# Patient Record
Sex: FEMALE | Race: WHITE | NOT HISPANIC OR LATINO
[De-identification: names, ages, dates, MRNs, and addresses within clinical notes are randomized per-mention and may not be internally consistent; named-entity substitution may affect disease eponyms.]

---

## 2018-08-22 ENCOUNTER — RESULT REVIEW (OUTPATIENT)
Age: 63
End: 2018-08-22

## 2020-08-26 ENCOUNTER — RESULT REVIEW (OUTPATIENT)
Age: 65
End: 2020-08-26

## 2021-04-13 ENCOUNTER — NON-APPOINTMENT (OUTPATIENT)
Age: 66
End: 2021-04-13

## 2021-04-13 DIAGNOSIS — Z85.41 PERSONAL HISTORY OF MALIGNANT NEOPLASM OF CERVIX UTERI: ICD-10-CM

## 2021-04-13 DIAGNOSIS — Z81.8 FAMILY HISTORY OF OTHER MENTAL AND BEHAVIORAL DISORDERS: ICD-10-CM

## 2021-04-13 DIAGNOSIS — R73.01 IMPAIRED FASTING GLUCOSE: ICD-10-CM

## 2021-04-13 DIAGNOSIS — Z87.42 PERSONAL HISTORY OF OTHER DISEASES OF THE FEMALE GENITAL TRACT: ICD-10-CM

## 2021-04-13 DIAGNOSIS — Z83.438 FAMILY HISTORY OF OTHER DISORDER OF LIPOPROTEIN METABOLISM AND OTHER LIPIDEMIA: ICD-10-CM

## 2021-04-13 DIAGNOSIS — Z82.62 FAMILY HISTORY OF OSTEOPOROSIS: ICD-10-CM

## 2021-04-13 DIAGNOSIS — Z87.891 PERSONAL HISTORY OF NICOTINE DEPENDENCE: ICD-10-CM

## 2021-04-13 DIAGNOSIS — Z86.2 PERSONAL HISTORY OF DISEASES OF THE BLOOD AND BLOOD-FORMING ORGANS AND CERTAIN DISORDERS INVOLVING THE IMMUNE MECHANISM: ICD-10-CM

## 2021-04-13 DIAGNOSIS — Z86.79 PERSONAL HISTORY OF OTHER DISEASES OF THE CIRCULATORY SYSTEM: ICD-10-CM

## 2021-04-13 DIAGNOSIS — Z78.9 OTHER SPECIFIED HEALTH STATUS: ICD-10-CM

## 2021-04-13 DIAGNOSIS — Z87.442 PERSONAL HISTORY OF URINARY CALCULI: ICD-10-CM

## 2021-04-13 DIAGNOSIS — Z92.89 PERSONAL HISTORY OF OTHER MEDICAL TREATMENT: ICD-10-CM

## 2021-04-13 DIAGNOSIS — Z82.49 FAMILY HISTORY OF ISCHEMIC HEART DISEASE AND OTHER DISEASES OF THE CIRCULATORY SYSTEM: ICD-10-CM

## 2021-04-13 RX ORDER — UBIDECARENONE 100 MG
100 CAPSULE ORAL
Refills: 0 | Status: ACTIVE | COMMUNITY

## 2021-04-13 RX ORDER — ACETAMINOPHEN 325 MG
TABLET ORAL
Refills: 0 | Status: ACTIVE | COMMUNITY

## 2021-04-13 RX ORDER — CALCIUM CARBONATE/VITAMIN D3 600 MG-10
TABLET ORAL
Refills: 0 | Status: ACTIVE | COMMUNITY

## 2021-04-21 ENCOUNTER — APPOINTMENT (OUTPATIENT)
Dept: FAMILY MEDICINE | Facility: CLINIC | Age: 66
End: 2021-04-21
Payer: COMMERCIAL

## 2021-04-21 VITALS
WEIGHT: 123 LBS | DIASTOLIC BLOOD PRESSURE: 82 MMHG | OXYGEN SATURATION: 98 % | HEIGHT: 63 IN | HEART RATE: 58 BPM | BODY MASS INDEX: 21.79 KG/M2 | TEMPERATURE: 97.8 F | SYSTOLIC BLOOD PRESSURE: 144 MMHG | RESPIRATION RATE: 15 BRPM

## 2021-04-21 VITALS
OXYGEN SATURATION: 98 % | DIASTOLIC BLOOD PRESSURE: 82 MMHG | TEMPERATURE: 97.8 F | WEIGHT: 123 LBS | BODY MASS INDEX: 21.79 KG/M2 | HEIGHT: 63 IN | HEART RATE: 58 BPM | SYSTOLIC BLOOD PRESSURE: 144 MMHG

## 2021-04-21 PROCEDURE — 99072 ADDL SUPL MATRL&STAF TM PHE: CPT

## 2021-04-21 PROCEDURE — 36415 COLL VENOUS BLD VENIPUNCTURE: CPT

## 2021-04-21 PROCEDURE — 99213 OFFICE O/P EST LOW 20 MIN: CPT | Mod: 25

## 2021-04-21 NOTE — HISTORY OF PRESENT ILLNESS
[FreeTextEntry1] : f/u hypercholestremia. [de-identified] : Patient presents to follow up for her cholesterol.  She has been taking the Atorvastatin regularly, and she does try to watch her diet. She does feel well.

## 2021-04-22 LAB
ALBUMIN SERPL ELPH-MCNC: 5.2 G/DL
ALP BLD-CCNC: 76 U/L
ALT SERPL-CCNC: 10 U/L
ANION GAP SERPL CALC-SCNC: 13 MMOL/L
AST SERPL-CCNC: 18 U/L
BILIRUB SERPL-MCNC: 0.5 MG/DL
BUN SERPL-MCNC: 10 MG/DL
CALCIUM SERPL-MCNC: 10.1 MG/DL
CHLORIDE SERPL-SCNC: 98 MMOL/L
CHOLEST SERPL-MCNC: 217 MG/DL
CO2 SERPL-SCNC: 25 MMOL/L
CREAT SERPL-MCNC: 0.67 MG/DL
GLUCOSE SERPL-MCNC: 97 MG/DL
HDLC SERPL-MCNC: 76 MG/DL
LDLC SERPL CALC-MCNC: 125 MG/DL
NONHDLC SERPL-MCNC: 140 MG/DL
POTASSIUM SERPL-SCNC: 4.3 MMOL/L
PROT SERPL-MCNC: 7.9 G/DL
SODIUM SERPL-SCNC: 136 MMOL/L
TRIGL SERPL-MCNC: 79 MG/DL

## 2021-04-25 ENCOUNTER — RX RENEWAL (OUTPATIENT)
Age: 66
End: 2021-04-25

## 2021-05-14 ENCOUNTER — RX RENEWAL (OUTPATIENT)
Age: 66
End: 2021-05-14

## 2021-07-25 ENCOUNTER — RX RENEWAL (OUTPATIENT)
Age: 66
End: 2021-07-25

## 2021-10-04 ENCOUNTER — NON-APPOINTMENT (OUTPATIENT)
Age: 66
End: 2021-10-04

## 2021-10-05 ENCOUNTER — APPOINTMENT (OUTPATIENT)
Dept: FAMILY MEDICINE | Facility: CLINIC | Age: 66
End: 2021-10-05
Payer: COMMERCIAL

## 2021-10-05 VITALS
HEART RATE: 60 BPM | WEIGHT: 116 LBS | TEMPERATURE: 97.2 F | OXYGEN SATURATION: 98 % | BODY MASS INDEX: 20.55 KG/M2 | DIASTOLIC BLOOD PRESSURE: 90 MMHG | HEIGHT: 63 IN | SYSTOLIC BLOOD PRESSURE: 140 MMHG

## 2021-10-05 PROCEDURE — G0008: CPT

## 2021-10-05 PROCEDURE — 99072 ADDL SUPL MATRL&STAF TM PHE: CPT

## 2021-10-05 PROCEDURE — 99213 OFFICE O/P EST LOW 20 MIN: CPT | Mod: 25

## 2021-10-05 PROCEDURE — 90686 IIV4 VACC NO PRSV 0.5 ML IM: CPT

## 2021-10-05 RX ORDER — GABAPENTIN 100 MG/1
100 CAPSULE ORAL
Refills: 0 | Status: DISCONTINUED | COMMUNITY
End: 2021-10-05

## 2021-10-05 RX ORDER — OXYCODONE 5 MG/1
5 TABLET ORAL
Refills: 0 | Status: DISCONTINUED | COMMUNITY
End: 2021-10-05

## 2021-10-05 RX ORDER — OLMESARTAN MEDOXOMIL 20 MG/1
20 TABLET, FILM COATED ORAL
Refills: 0 | Status: DISCONTINUED | COMMUNITY
End: 2021-10-05

## 2021-10-05 NOTE — PLAN
[FreeTextEntry1] : BPs reviewed today and are elevated , I have increased Olmesartan to 20 mg daily.  I have recommended pt see Cardiology for further evaluation of elevated and fluctuating readings/\par Flu shot administered and Xanax refilled.\par  Advised to continue medication , diet , exercise and BP monitoring and followups. as well as self relaxation techniques . \par

## 2021-10-05 NOTE — PHYSICAL EXAM
[No Acute Distress] : no acute distress [Well Nourished] : well nourished [Well Developed] : well developed [Well-Appearing] : well-appearing [No JVD] : no jugular venous distention [No Lymphadenopathy] : no lymphadenopathy [Supple] : supple [Thyroid Normal, No Nodules] : the thyroid was normal and there were no nodules present [No Respiratory Distress] : no respiratory distress  [No Accessory Muscle Use] : no accessory muscle use [Clear to Auscultation] : lungs were clear to auscultation bilaterally [Normal Rate] : normal rate  [Regular Rhythm] : with a regular rhythm [Normal S1, S2] : normal S1 and S2 [No Murmur] : no murmur heard [No Carotid Bruits] : no carotid bruits [No Abdominal Bruit] : a ~M bruit was not heard ~T in the abdomen [No Varicosities] : no varicosities [Pedal Pulses Present] : the pedal pulses are present [No Edema] : there was no peripheral edema [No Palpable Aorta] : no palpable aorta [No Extremity Clubbing/Cyanosis] : no extremity clubbing/cyanosis [Normal Posterior Cervical Nodes] : no posterior cervical lymphadenopathy [No Joint Swelling] : no joint swelling [Grossly Normal Strength/Tone] : grossly normal strength/tone [No Rash] : no rash [Coordination Grossly Intact] : coordination grossly intact [No Focal Deficits] : no focal deficits [Deep Tendon Reflexes (DTR)] : deep tendon reflexes were 2+ and symmetric [Normal Gait] : normal gait [Normal Affect] : the affect was normal [Normal Insight/Judgement] : insight and judgment were intact [de-identified] : Anxious appearing

## 2021-10-05 NOTE — HISTORY OF PRESENT ILLNESS
[FreeTextEntry8] : Patient is here for BP check . Patient has been compliant with exercise , low sodium diet , decreased caffeine intake and compliance with medications. She has been checking BPs sporadically and is her for BP review .\par She is concerned since her BP are fluctuating and elevated >130/80. She does admit to stress and anxiety , is concerned that she is on so many medications as well. She has undergone Psychotherapy in the past , and is requesting refill of her xanax today \par She is here for Flu vaccine \par \par

## 2021-10-14 ENCOUNTER — RESULT REVIEW (OUTPATIENT)
Age: 66
End: 2021-10-14

## 2021-11-30 ENCOUNTER — NON-APPOINTMENT (OUTPATIENT)
Age: 66
End: 2021-11-30

## 2022-02-25 ENCOUNTER — APPOINTMENT (OUTPATIENT)
Dept: FAMILY MEDICINE | Facility: CLINIC | Age: 67
End: 2022-02-25
Payer: COMMERCIAL

## 2022-02-25 VITALS
SYSTOLIC BLOOD PRESSURE: 162 MMHG | HEIGHT: 63 IN | HEART RATE: 74 BPM | TEMPERATURE: 97.6 F | DIASTOLIC BLOOD PRESSURE: 80 MMHG | OXYGEN SATURATION: 99 % | BODY MASS INDEX: 21.44 KG/M2 | WEIGHT: 121 LBS

## 2022-02-25 VITALS — DIASTOLIC BLOOD PRESSURE: 75 MMHG | SYSTOLIC BLOOD PRESSURE: 120 MMHG

## 2022-02-25 DIAGNOSIS — M85.80 OTHER SPECIFIED DISORDERS OF BONE DENSITY AND STRUCTURE, UNSPECIFIED SITE: ICD-10-CM

## 2022-02-25 DIAGNOSIS — Z76.0 ENCOUNTER FOR ISSUE OF REPEAT PRESCRIPTION: ICD-10-CM

## 2022-02-25 DIAGNOSIS — Z78.9 OTHER SPECIFIED HEALTH STATUS: ICD-10-CM

## 2022-02-25 DIAGNOSIS — M81.0 AGE-RELATED OSTEOPOROSIS W/OUT CURRENT PATHOLOGICAL FRACTURE: ICD-10-CM

## 2022-02-25 PROCEDURE — 99214 OFFICE O/P EST MOD 30 MIN: CPT | Mod: 25

## 2022-02-25 PROCEDURE — 36415 COLL VENOUS BLD VENIPUNCTURE: CPT

## 2022-02-25 PROCEDURE — 99072 ADDL SUPL MATRL&STAF TM PHE: CPT

## 2022-02-25 RX ORDER — PHENAZOPYRIDINE HYDROCHLORIDE 200 MG/1
200 TABLET ORAL 3 TIMES DAILY
Refills: 0 | Status: COMPLETED | COMMUNITY
End: 2022-02-25

## 2022-02-25 RX ORDER — ALENDRONATE SODIUM 70 MG/1
70 TABLET ORAL
Refills: 0 | Status: COMPLETED | COMMUNITY
End: 2022-02-25

## 2022-02-25 RX ORDER — ALPRAZOLAM 0.25 MG/1
0.25 TABLET ORAL
Qty: 30 | Refills: 0 | Status: ACTIVE | COMMUNITY
Start: 1900-01-01 | End: 1900-01-01

## 2022-02-25 NOTE — HISTORY OF PRESENT ILLNESS
[FreeTextEntry1] : here for f/u chronic concerns  [de-identified] : this is a 65yo here for f/u htn/ hld/ anxiety/ osteopenia, here for f/u chronic concerns, BW, and med renewals.\par recent BW checked with cardiology Dr. Michel 02/18/22, lipid values improved on statin ldl 103/ hdl 83/ total chol. 204/ Trig 91/ VLDL 18/ chol/HDL ratio 2.5. echo performed, reports normal result. \par o/w in no acute distress, no other major concerns and reports feeling well. \par will evaluate and manage as appropriate.

## 2022-02-25 NOTE — CURRENT MEDS
[Takes medication as prescribed] : takes [None] : Patient does not have any barriers to medication adherence [Yes] : Reviewed medication list for presence of high-risk medications. [Benzodiazepines] : benzodiazepines [FreeTextEntry1] : counseled on use, reports taking medication as prescribed, tolerating well.

## 2022-02-25 NOTE — COUNSELING
[Fall prevention counseling provided] : Fall prevention counseling provided [Behavioral health counseling provided] : Behavioral health counseling provided [Sleep ___ hours/day] : Sleep [unfilled] hours/day [Engage in a relaxing activity] : Engage in a relaxing activity [Plan in advance] : Plan in advance [de-identified] : Maintain balanced diet of whole grain, fruits and vegetables, lean meats, skinless poultry, fish, beans, soy products, eggs, nuts, and low fat dairy as per FDA dietary guidelines. \par Avoid high calorie/low nutrient foods. \par vegetables/fruits- at least 5 servings/day, \par whole grains- 100% whole grain and at least 3 grams fiber/day.\par reduce/eliminate saturated fat- less than 5% on nutrition labels (ex. galan, deli meat, hot dogs, fried foods, cookies, ice cream, chips, soft drinks, etc.)\par stay within your FDA recommended daily calorie needs or use the plate method to portion intake. \par Avoid fast food and limit eating out. When eating out choose healthy alternatives (ex. grilled, baked, steamed. low fat dressings. avoid french fries).\par DO NOT CONSUME FOODS YOU ARE ALLERGIC TO DESPITE DIETARY RECOMMENDATIONS.\par \par For more information you can visit www.Health.gov \par \par Incorporate regular physical activity most days of the week. \par Regular aerobic activity- moderate intensity, most days/wk, at least 30min/day- ex. brisk walk 2.5miles/hr, ballroom dancing, water aerobics, light yard work (raking/lawn mowing) actively playing basketball, biking 10miles/hr.\par Muscle strengthening and strength/resistance exercises 2-3days/wk- ex. free weights, resistance bands, your own weight (squats/pull-ups/push-ups).\par Neuro-motor exercises for balance/agility/coordination and flexibility exercises 2-3days/wk, 20-30min/day- ex. yoga and norma-chi.\par Bone strengthening at least 30min/day, most days of the week- ex. weights, resistance bands, running, brisk walking, jumping rope, stair climbing, tennis.\par Decrease sedentary time. \par LISTEN TO YOUR BODY AND MODIFY ACTIVITY AS NEEDED- DO NOT OVEREXERT YOURSELF DURING PHYSICAL ACTIVITY DESPITE RECOMMENDATION.\par \par For more information you can visit www.Health.gov \par \par Depression/Anxiety are mood disorders. The symptoms of depression/anxiety can affect how you think and feel and how you handle every day activities (work, eating, sleeping). Multiple treatments are available such as psychotherapy/counseling, medications called antidepressants or anxiolytics, or other therapies. \par Some medications can take 2-4 weeks to work and can have side effects; notify our office if you experience any side effects. Suicidal thoughts or attempt may occur in some people, especially if agitated when first initiating medication, before it begins to work; if suicidal thoughts/ideations or suicidal attempt- call 911 for emergency services/go to the nearest emergency department. \par IF IN CRISIS YOU HAVE SUPPORT= CALL 911/EMERGENCY SERVICES, CALL [facility] [phone], CALL OUR OFFICE.  [None] : None [Good understanding] : Patient has a good understanding of lifestyle changes and steps needed to achieve self management goal

## 2022-02-25 NOTE — HEALTH RISK ASSESSMENT
[Never] : Never [Yes] : Yes [Monthly or less (1 pt)] : Monthly or less (1 point) [1 or 2 (0 pts)] : 1 or 2 (0 points) [Never (0 pts)] : Never (0 points) [No] : In the past 12 months have you used drugs other than those required for medical reasons? No [No falls in past year] : Patient reported no falls in the past year [0] : 2) Feeling down, depressed, or hopeless: Not at all (0) [PHQ-2 Negative - No further assessment needed] : PHQ-2 Negative - No further assessment needed [Patient/Caregiver not ready to engage] : , patient/caregiver not ready to engage [Audit-CScore] : 1 [de-identified] : active [de-identified] : well balanced low fat, low chol  [de-identified] : mom placed in hospice yesterday, managing well for now [[id number]] : 0 [AdvancecareDate] : 02/22

## 2022-02-25 NOTE — PHYSICAL EXAM
[No Acute Distress] : no acute distress [Well Nourished] : well nourished [Well Developed] : well developed [No Respiratory Distress] : no respiratory distress  [No Accessory Muscle Use] : no accessory muscle use [Clear to Auscultation] : lungs were clear to auscultation bilaterally [No Carotid Bruits] : no carotid bruits [No Edema] : there was no peripheral edema [No Rash] : no rash [Coordination Grossly Intact] : coordination grossly intact [Normal Gait] : normal gait [Normal Mental Status] : the patient's orientation, memory, attention, language and fund of knowledge were normal [Appropriate] : appropriate [Normal Rate] : a normal rate [Normal Rhythm] : a normal rhythm [Normal Tone] : normal tone [Normal Volume] : normal volume [Normal] : normal throught processes [Normal Associations] :  no deficiency [Impaired judgment] : intact judgment [Impaired Insight] : intact insight [Delusions] : exhibited no delusions [Hallucinations] : exhibited no hallucinations [Obsessions] : denied obsessions [Preoccupation with Violence] : denied preoccupation with violent thoughts [Suicidal Ideation] : denied suicidal ideation [Suicidal Intent] : denied suicidal intent [Suicidal Plan] : denied suicidal plans [Homicidal Ideation] : denied homicidal ideation [Homicidal Intent] : denied homicidal intention [Homicidal Plan] : denied homicidal plans

## 2022-02-25 NOTE — ASSESSMENT
[FreeTextEntry1] : htn/ hld \par BP well managed\par on Olmesartan and Metoprolol\par cardiology Dr. Michel \par in no acute distress and o/w offers no complaints \par medication renewal provided as requested \par check cmp - "labs drawn in office" \par \par anxiety \par well managed\par on Alprazolam, infrequently used for sx mgmt\par o/w conservatively manages well\par denies need for further intervention at this time\par advised to notify provider if worsening s/s anxiety or new onset concerns\par counseled on use of medication\par in no acute distress and o/w offers no complaints \par medication renewal provided as requested \par \par preventative health\par annual wellness- scheduling\par shingles- checking with insurance\par colonoscopy- 2018, nml\par ophthalmology dilated eye exam- needs f/u\par GYN for Pap- 08/21, nml Dr. Sands\par mammo- 10/19?? needs f/u\par DEXA- 10/21 osteopenia on VitD and calcium \par continued follow up with PCP for chronic concerns and preventative health management. \par patient verbalized understanding and agrees with plan of care.

## 2022-02-28 LAB
ALBUMIN SERPL ELPH-MCNC: 5.1 G/DL
ALP BLD-CCNC: 65 U/L
ALT SERPL-CCNC: 11 U/L
ANION GAP SERPL CALC-SCNC: 16 MMOL/L
AST SERPL-CCNC: 21 U/L
BILIRUB SERPL-MCNC: 0.5 MG/DL
BUN SERPL-MCNC: 15 MG/DL
CALCIUM SERPL-MCNC: 9.9 MG/DL
CHLORIDE SERPL-SCNC: 102 MMOL/L
CO2 SERPL-SCNC: 22 MMOL/L
CREAT SERPL-MCNC: 0.81 MG/DL
GLUCOSE SERPL-MCNC: 96 MG/DL
POTASSIUM SERPL-SCNC: 5 MMOL/L
PROT SERPL-MCNC: 7.7 G/DL
SODIUM SERPL-SCNC: 140 MMOL/L

## 2022-03-01 ENCOUNTER — NON-APPOINTMENT (OUTPATIENT)
Age: 67
End: 2022-03-01

## 2022-05-19 ENCOUNTER — RX RENEWAL (OUTPATIENT)
Age: 67
End: 2022-05-19

## 2022-07-05 ENCOUNTER — APPOINTMENT (OUTPATIENT)
Dept: FAMILY MEDICINE | Facility: CLINIC | Age: 67
End: 2022-07-05

## 2022-07-11 ENCOUNTER — APPOINTMENT (OUTPATIENT)
Dept: FAMILY MEDICINE | Facility: CLINIC | Age: 67
End: 2022-07-11

## 2022-08-01 ENCOUNTER — APPOINTMENT (OUTPATIENT)
Dept: FAMILY MEDICINE | Facility: CLINIC | Age: 67
End: 2022-08-01

## 2022-08-01 VITALS
WEIGHT: 121 LBS | OXYGEN SATURATION: 100 % | DIASTOLIC BLOOD PRESSURE: 76 MMHG | SYSTOLIC BLOOD PRESSURE: 161 MMHG | BODY MASS INDEX: 21.44 KG/M2 | TEMPERATURE: 97.2 F | HEIGHT: 63 IN | HEART RATE: 63 BPM

## 2022-08-01 DIAGNOSIS — F41.1 GENERALIZED ANXIETY DISORDER: ICD-10-CM

## 2022-08-01 PROCEDURE — 99203 OFFICE O/P NEW LOW 30 MIN: CPT | Mod: 25

## 2022-08-01 PROCEDURE — 36415 COLL VENOUS BLD VENIPUNCTURE: CPT

## 2022-08-03 NOTE — HEALTH RISK ASSESSMENT
[Never] : Never [No] : In the past 12 months have you used drugs other than those required for medical reasons? No [No falls in past year] : Patient reported no falls in the past year [0] : 2) Feeling down, depressed, or hopeless: Not at all (0) [de-identified] : pilates, bicycle

## 2022-08-23 ENCOUNTER — RX RENEWAL (OUTPATIENT)
Age: 67
End: 2022-08-23

## 2022-09-02 ENCOUNTER — TRANSCRIPTION ENCOUNTER (OUTPATIENT)
Age: 67
End: 2022-09-02

## 2022-09-28 ENCOUNTER — APPOINTMENT (OUTPATIENT)
Dept: FAMILY MEDICINE | Facility: CLINIC | Age: 67
End: 2022-09-28

## 2022-09-28 VITALS
SYSTOLIC BLOOD PRESSURE: 150 MMHG | DIASTOLIC BLOOD PRESSURE: 84 MMHG | OXYGEN SATURATION: 98 % | HEART RATE: 64 BPM | TEMPERATURE: 98 F

## 2022-09-28 DIAGNOSIS — Z23 ENCOUNTER FOR IMMUNIZATION: ICD-10-CM

## 2022-09-28 PROCEDURE — 81003 URINALYSIS AUTO W/O SCOPE: CPT | Mod: QW

## 2022-09-28 PROCEDURE — G0402 INITIAL PREVENTIVE EXAM: CPT

## 2022-09-28 PROCEDURE — 90677 PCV20 VACCINE IM: CPT

## 2022-09-28 PROCEDURE — G0009: CPT

## 2022-09-28 NOTE — ASSESSMENT
[FreeTextEntry1] : patient states she checks her BP at home and it is normal, denies any symptoms.\par \par HCM\par -counseling provided on maintaining healthy lifestyle (pilates and weight training, reversed her osteoporosis)\par *MAMMO -12/22\par *PAP -11/22\par *COLO - due for colonscopy\par *IMMUNIZATIONS - PCV 20 today \par \par

## 2022-09-28 NOTE — HEALTH RISK ASSESSMENT
[Never] : Never [Yes] : Yes [2 - 4 times a month (2 pts)] : 2-4 times a month (2 points) [1 or 2 (0 pts)] : 1 or 2 (0 points) [Never (0 pts)] : Never (0 points) [No] : In the past 12 months have you used drugs other than those required for medical reasons? No [No falls in past year] : Patient reported no falls in the past year [0] : 2) Feeling down, depressed, or hopeless: Not at all (0) [PHQ-2 Negative - No further assessment needed] : PHQ-2 Negative - No further assessment needed [Patient reported mammogram was normal] : Patient reported mammogram was normal [Patient reported PAP Smear was normal] : Patient reported PAP Smear was normal [HIV test declined] : HIV test declined [Hepatitis C test declined] : Hepatitis C test declined [With Significant Other] : lives with significant other [# of Members in Household ___] :  household currently consist of [unfilled] member(s) [] :  [# Of Children ___] : has [unfilled] children [Sexually Active] : sexually active [Feels Safe at Home] : Feels safe at home [Smoke Detector] : smoke detector [Carbon Monoxide Detector] : carbon monoxide detector [Seat Belt] :  uses seat belt [Sunscreen] : uses sunscreen [Audit-CScore] : 2 [Change in mental status noted] : No change in mental status noted [Reports changes in hearing] : Reports no changes in hearing [Reports changes in vision] : Reports no changes in vision [Reports normal functional visual acuity (ie: able to read med bottle)] : Reports poor functional visual acuity.  [Reports changes in dental health] : Reports no changes in dental health [Guns at Home] : no guns at home [Travel to Developing Areas] : does not  travel to developing areas [TB Exposure] : is not being exposed to tuberculosis [MammogramDate] : 10/21 [PapearDate] : 120/21

## 2022-10-02 LAB
BILIRUB UR QL STRIP: NORMAL
CLARITY UR: CLEAR
COLLECTION METHOD: NORMAL
GLUCOSE UR-MCNC: NORMAL
HCG UR QL: 0.2 EU/DL
HGB UR QL STRIP.AUTO: NORMAL
KETONES UR-MCNC: NORMAL
LEUKOCYTE ESTERASE UR QL STRIP: NORMAL
NITRITE UR QL STRIP: NORMAL
PH UR STRIP: 5.5
PROT UR STRIP-MCNC: NORMAL
SP GR UR STRIP: 1.03

## 2022-10-13 NOTE — PLAN
Spoke with patient regarding rescheduling her fracture clinic appointment ,Rafael is out the of morning 10-27-22  
[FreeTextEntry1] : Patient is advised to continue with her current dose of Atorvastatin, and to continue to limit the excess fried food, red meat and full fat dairy in her diet pending the results of her labs.  \par Her pressure remains stable, and she is advised to continue with the remainder of her medications as well. \par \par She will continue to monitor her pressure at home, and in 7-10 days, she will follow up with her log.  If her numbers are elevated, we can then address medication.  She is also advised to increase her fluids, and to continue to limit the excess salt and caffeine.

## 2022-11-01 ENCOUNTER — RX RENEWAL (OUTPATIENT)
Age: 67
End: 2022-11-01

## 2022-11-14 ENCOUNTER — RX RENEWAL (OUTPATIENT)
Age: 67
End: 2022-11-14

## 2022-11-16 ENCOUNTER — RX RENEWAL (OUTPATIENT)
Age: 67
End: 2022-11-16

## 2022-12-08 ENCOUNTER — RX RENEWAL (OUTPATIENT)
Age: 67
End: 2022-12-08

## 2023-01-09 ENCOUNTER — APPOINTMENT (OUTPATIENT)
Dept: FAMILY MEDICINE | Facility: CLINIC | Age: 68
End: 2023-01-09
Payer: MEDICARE

## 2023-01-09 VITALS
SYSTOLIC BLOOD PRESSURE: 146 MMHG | HEIGHT: 63 IN | DIASTOLIC BLOOD PRESSURE: 78 MMHG | HEART RATE: 65 BPM | OXYGEN SATURATION: 98 % | BODY MASS INDEX: 20.55 KG/M2 | TEMPERATURE: 97.3 F | WEIGHT: 116 LBS

## 2023-01-09 PROCEDURE — 99213 OFFICE O/P EST LOW 20 MIN: CPT

## 2023-01-15 NOTE — HISTORY OF PRESENT ILLNESS
[Hypertension] : Hypertension [FreeTextEntry6] : Patient is following up on hypertension, which has been uncontrolled despite taking current medication. \par Patient does pilates and yoga daily. \par Patient recently started a anxiety management zoom pre-recorded meetings in addition to guided meditation.

## 2023-01-23 ENCOUNTER — APPOINTMENT (OUTPATIENT)
Dept: FAMILY MEDICINE | Facility: CLINIC | Age: 68
End: 2023-01-23
Payer: MEDICARE

## 2023-01-23 VITALS
WEIGHT: 116 LBS | OXYGEN SATURATION: 91 % | HEART RATE: 68 BPM | HEIGHT: 63 IN | DIASTOLIC BLOOD PRESSURE: 60 MMHG | BODY MASS INDEX: 20.55 KG/M2 | SYSTOLIC BLOOD PRESSURE: 110 MMHG | TEMPERATURE: 98 F

## 2023-01-23 PROCEDURE — 99213 OFFICE O/P EST LOW 20 MIN: CPT

## 2023-01-24 NOTE — PLAN
[FreeTextEntry1] : Counseling provided.  Plan is to follow-up in 2 months for BP check.  Continue current medications.

## 2023-01-24 NOTE — HISTORY OF PRESENT ILLNESS
[FreeTextEntry6] : LATOYA CHRISTENSEN is a 67 year old female presenting for blood pressure check.  Presents with BP log from the last 2 weeks.\par Average SBP in 120s range. Noted to have some drops in BP (1-2x). Some elevations were noted as well (SBP 140s).

## 2023-01-30 ENCOUNTER — RX RENEWAL (OUTPATIENT)
Age: 68
End: 2023-01-30

## 2023-02-01 ENCOUNTER — RX RENEWAL (OUTPATIENT)
Age: 68
End: 2023-02-01

## 2023-04-12 ENCOUNTER — APPOINTMENT (OUTPATIENT)
Dept: FAMILY MEDICINE | Facility: CLINIC | Age: 68
End: 2023-04-12

## 2023-04-17 ENCOUNTER — APPOINTMENT (OUTPATIENT)
Dept: FAMILY MEDICINE | Facility: CLINIC | Age: 68
End: 2023-04-17
Payer: MEDICARE

## 2023-04-17 VITALS
HEART RATE: 64 BPM | OXYGEN SATURATION: 96 % | BODY MASS INDEX: 20.55 KG/M2 | TEMPERATURE: 98.4 F | WEIGHT: 116 LBS | DIASTOLIC BLOOD PRESSURE: 82 MMHG | SYSTOLIC BLOOD PRESSURE: 162 MMHG

## 2023-04-17 PROCEDURE — 99214 OFFICE O/P EST MOD 30 MIN: CPT

## 2023-04-17 RX ORDER — METOPROLOL SUCCINATE 50 MG/1
50 TABLET, EXTENDED RELEASE ORAL
Qty: 90 | Refills: 0 | Status: DISCONTINUED | COMMUNITY
Start: 2021-07-25 | End: 2023-04-17

## 2023-04-17 NOTE — HISTORY OF PRESENT ILLNESS
[Hypertension] : Hypertension [FreeTextEntry6] : \par States stopped taking Olmesartan bc of low BP. Now only takes in afternoon if SBP >140. \par \par Patient will be going for hearing evaluation in 4 days. States she cant hear out of right ear.

## 2023-04-17 NOTE — ASSESSMENT
[FreeTextEntry1] : Symptomatic hypotensive episodes\par - Low blood pressure noted multiple instances, patient felt lightheaded and dizzy, skipped the dose of olmesartan.\par -Recommend decreasing metoprolol dosage and continuing olmesartan\par -Follow-up for BP check\par

## 2023-04-24 LAB
ALBUMIN SERPL ELPH-MCNC: 4.8 G/DL
ALP BLD-CCNC: 58 U/L
ALT SERPL-CCNC: 10 U/L
ANION GAP SERPL CALC-SCNC: 12 MMOL/L
AST SERPL-CCNC: 19 U/L
BILIRUB SERPL-MCNC: 0.2 MG/DL
BUN SERPL-MCNC: 13 MG/DL
CALCIUM SERPL-MCNC: 9.8 MG/DL
CHLORIDE SERPL-SCNC: 99 MMOL/L
CO2 SERPL-SCNC: 25 MMOL/L
CREAT SERPL-MCNC: 0.88 MG/DL
EGFR: 73 ML/MIN/1.73M2
GLUCOSE SERPL-MCNC: 111 MG/DL
POTASSIUM SERPL-SCNC: 4.7 MMOL/L
PROT SERPL-MCNC: 7.3 G/DL
SODIUM SERPL-SCNC: 136 MMOL/L

## 2023-07-13 ENCOUNTER — RX RENEWAL (OUTPATIENT)
Age: 68
End: 2023-07-13

## 2023-07-17 ENCOUNTER — APPOINTMENT (OUTPATIENT)
Dept: FAMILY MEDICINE | Facility: CLINIC | Age: 68
End: 2023-07-17
Payer: MEDICARE

## 2023-07-17 VITALS
DIASTOLIC BLOOD PRESSURE: 70 MMHG | BODY MASS INDEX: 20.55 KG/M2 | HEIGHT: 63 IN | TEMPERATURE: 97.4 F | HEART RATE: 64 BPM | WEIGHT: 116 LBS | SYSTOLIC BLOOD PRESSURE: 130 MMHG | OXYGEN SATURATION: 95 %

## 2023-07-17 DIAGNOSIS — E78.2 MIXED HYPERLIPIDEMIA: ICD-10-CM

## 2023-07-17 PROCEDURE — 90750 HZV VACC RECOMBINANT IM: CPT | Mod: GY

## 2023-07-17 PROCEDURE — 90471 IMMUNIZATION ADMIN: CPT

## 2023-07-17 PROCEDURE — 36415 COLL VENOUS BLD VENIPUNCTURE: CPT

## 2023-07-17 PROCEDURE — 99214 OFFICE O/P EST MOD 30 MIN: CPT | Mod: 25

## 2023-07-17 NOTE — PLAN
[FreeTextEntry1] : Blood work done in office today. Will follow up on results with patient.\par Return in 2 to 6 months for second dose of Shingrix. \par Patient will be due for her CPE and is September. \par May return in 3 months for BP check, CPE, second dose of Shingrix, flu vaccine - per patient request.

## 2023-07-19 ENCOUNTER — NON-APPOINTMENT (OUTPATIENT)
Age: 68
End: 2023-07-19

## 2023-07-19 LAB
ALBUMIN SERPL ELPH-MCNC: 5 G/DL
ALP BLD-CCNC: 63 U/L
ALT SERPL-CCNC: 12 U/L
ANION GAP SERPL CALC-SCNC: 19 MMOL/L
AST SERPL-CCNC: 20 U/L
BILIRUB SERPL-MCNC: 0.5 MG/DL
BUN SERPL-MCNC: 18 MG/DL
CALCIUM SERPL-MCNC: 10 MG/DL
CHLORIDE SERPL-SCNC: 97 MMOL/L
CHOLEST SERPL-MCNC: 239 MG/DL
CO2 SERPL-SCNC: 18 MMOL/L
CREAT SERPL-MCNC: 0.87 MG/DL
EGFR: 74 ML/MIN/1.73M2
ESTIMATED AVERAGE GLUCOSE: 108 MG/DL
GLUCOSE SERPL-MCNC: 95 MG/DL
HBA1C MFR BLD HPLC: 5.4 %
HDLC SERPL-MCNC: 83 MG/DL
LDLC SERPL CALC-MCNC: 138 MG/DL
NONHDLC SERPL-MCNC: 156 MG/DL
POTASSIUM SERPL-SCNC: 5.4 MMOL/L
PROT SERPL-MCNC: 7.5 G/DL
SODIUM SERPL-SCNC: 135 MMOL/L
TRIGL SERPL-MCNC: 102 MG/DL

## 2023-10-12 ENCOUNTER — RX RENEWAL (OUTPATIENT)
Age: 68
End: 2023-10-12

## 2023-10-17 ENCOUNTER — NON-APPOINTMENT (OUTPATIENT)
Age: 68
End: 2023-10-17

## 2023-10-18 ENCOUNTER — NON-APPOINTMENT (OUTPATIENT)
Age: 68
End: 2023-10-18

## 2023-10-18 ENCOUNTER — APPOINTMENT (OUTPATIENT)
Dept: ORTHOPEDIC SURGERY | Facility: CLINIC | Age: 68
End: 2023-10-18
Payer: MEDICARE

## 2023-10-18 PROCEDURE — 99203 OFFICE O/P NEW LOW 30 MIN: CPT

## 2023-11-07 ENCOUNTER — APPOINTMENT (OUTPATIENT)
Dept: ORTHOPEDIC SURGERY | Facility: CLINIC | Age: 68
End: 2023-11-07
Payer: MEDICARE

## 2023-11-07 DIAGNOSIS — M62.838 OTHER MUSCLE SPASM: ICD-10-CM

## 2023-11-07 DIAGNOSIS — M47.22 OTHER SPONDYLOSIS WITH RADICULOPATHY, CERVICAL REGION: ICD-10-CM

## 2023-11-07 PROCEDURE — 99213 OFFICE O/P EST LOW 20 MIN: CPT

## 2023-11-07 PROCEDURE — 72100 X-RAY EXAM L-S SPINE 2/3 VWS: CPT

## 2024-01-17 ENCOUNTER — RESULT CHARGE (OUTPATIENT)
Age: 69
End: 2024-01-17

## 2024-01-17 ENCOUNTER — APPOINTMENT (OUTPATIENT)
Dept: FAMILY MEDICINE | Facility: CLINIC | Age: 69
End: 2024-01-17
Payer: MEDICARE

## 2024-01-17 ENCOUNTER — NON-APPOINTMENT (OUTPATIENT)
Age: 69
End: 2024-01-17

## 2024-01-17 VITALS
DIASTOLIC BLOOD PRESSURE: 80 MMHG | WEIGHT: 117 LBS | TEMPERATURE: 97.8 F | SYSTOLIC BLOOD PRESSURE: 120 MMHG | OXYGEN SATURATION: 97 % | BODY MASS INDEX: 20.73 KG/M2 | HEIGHT: 63 IN | HEART RATE: 69 BPM

## 2024-01-17 DIAGNOSIS — Z00.00 ENCOUNTER FOR GENERAL ADULT MEDICAL EXAMINATION W/OUT ABNORMAL FINDINGS: ICD-10-CM

## 2024-01-17 DIAGNOSIS — I10 ESSENTIAL (PRIMARY) HYPERTENSION: ICD-10-CM

## 2024-01-17 PROCEDURE — G0439: CPT

## 2024-01-17 PROCEDURE — 93000 ELECTROCARDIOGRAM COMPLETE: CPT

## 2024-01-17 PROCEDURE — 36415 COLL VENOUS BLD VENIPUNCTURE: CPT

## 2024-01-17 NOTE — HEALTH RISK ASSESSMENT
[1 or 2 (0 pts)] : 1 or 2 (0 points) [Never (0 pts)] : Never (0 points) [No] : In the past 12 months have you used drugs other than those required for medical reasons? No [No falls in past year] : Patient reported no falls in the past year [0] : 2) Feeling down, depressed, or hopeless: Not at all (0) [PHQ-2 Negative - No further assessment needed] : PHQ-2 Negative - No further assessment needed [Patient reported mammogram was normal] : Patient reported mammogram was normal [Patient reported PAP Smear was normal] : Patient reported PAP Smear was normal [Patient reported bone density results were normal] : Patient reported bone density results were normal [Patient reported colonoscopy was normal] : Patient reported colonoscopy was normal [HIV test declined] : HIV test declined [Hepatitis C test declined] : Hepatitis C test declined [With Family] : lives with family [# of Members in Household ___] :  household currently consist of [unfilled] member(s) [Employed] : employed [College] : College [] :  [# Of Children ___] : has [unfilled] children [Sexually Active] : sexually active [Feels Safe at Home] : Feels safe at home [Fully functional (bathing, dressing, toileting, transferring, walking, feeding)] : Fully functional (bathing, dressing, toileting, transferring, walking, feeding) [Fully functional (using the telephone, shopping, preparing meals, housekeeping, doing laundry, using] : Fully functional and needs no help or supervision to perform IADLs (using the telephone, shopping, preparing meals, housekeeping, doing laundry, using transportation, managing medications and managing finances) [Reports normal functional visual acuity (ie: able to read med bottle)] : Reports normal functional visual acuity [Smoke Detector] : smoke detector [Carbon Monoxide Detector] : carbon monoxide detector [Seat Belt] :  uses seat belt [Sunscreen] : uses sunscreen [Never] : Never [Audit-CScore] : 0 [EUT0Wlwhf] : 0 [Change in mental status noted] : No change in mental status noted [Language] : denies difficulty with language [Behavior] : denies difficulty with behavior [Learning/Retaining New Information] : denies difficulty learning/retaining new information [Handling Complex Tasks] : denies difficulty handling complex tasks [Spatial Ability and Orientation] : denies difficulty with spatial ability and orientation [Reports changes in hearing] : Reports no changes in hearing [Reports changes in vision] : Reports no changes in vision [Reports changes in dental health] : Reports no changes in dental health [Guns at Home] : no guns at home [Travel to Developing Areas] : does not  travel to developing areas [TB Exposure] : is not being exposed to tuberculosis [Caregiver Concerns] : does not have caregiver concerns [MammogramDate] : 01/24 [PapSmearDate] : 01/23 [BoneDensityDate] : 01/22 [ColonoscopyDate] : 07/19 [FreeTextEntry2] : lennox

## 2024-02-06 ENCOUNTER — TRANSCRIPTION ENCOUNTER (OUTPATIENT)
Age: 69
End: 2024-02-06

## 2024-02-06 LAB
25(OH)D3 SERPL-MCNC: 86.6 NG/ML
ALBUMIN SERPL ELPH-MCNC: 5 G/DL
ALP BLD-CCNC: 65 U/L
ALT SERPL-CCNC: 9 U/L
ANION GAP SERPL CALC-SCNC: 12 MMOL/L
APPEARANCE: CLEAR
AST SERPL-CCNC: 17 U/L
BACTERIA: NEGATIVE /HPF
BILIRUB SERPL-MCNC: 0.3 MG/DL
BILIRUB UR QL STRIP: NORMAL
BILIRUBIN URINE: NEGATIVE
BLOOD URINE: NEGATIVE
BUN SERPL-MCNC: 14 MG/DL
CALCIUM SERPL-MCNC: 10 MG/DL
CAST: 0 /LPF
CHLORIDE SERPL-SCNC: 96 MMOL/L
CHOLEST SERPL-MCNC: 222 MG/DL
CLARITY UR: CLEAR
CO2 SERPL-SCNC: 24 MMOL/L
COLLECTION METHOD: NORMAL
COLOR: YELLOW
CREAT SERPL-MCNC: 0.97 MG/DL
EGFR: 64 ML/MIN/1.73M2
EPITHELIAL CELLS: 9 /HPF
ESTIMATED AVERAGE GLUCOSE: 105 MG/DL
GLUCOSE QUALITATIVE U: NEGATIVE MG/DL
GLUCOSE SERPL-MCNC: 110 MG/DL
GLUCOSE UR-MCNC: NORMAL
HBA1C MFR BLD HPLC: 5.3 %
HCG UR QL: 0.2 EU/DL
HCT VFR BLD CALC: 38.5 %
HDLC SERPL-MCNC: 89 MG/DL
HGB BLD-MCNC: 12.4 G/DL
HGB UR QL STRIP.AUTO: NORMAL
KETONES UR-MCNC: NORMAL
KETONES URINE: NEGATIVE MG/DL
LDLC SERPL CALC-MCNC: 117 MG/DL
LEUKOCYTE ESTERASE UR QL STRIP: ABNORMAL
LEUKOCYTE ESTERASE URINE: ABNORMAL
MCHC RBC-ENTMCNC: 32.2 GM/DL
MCHC RBC-ENTMCNC: 32.4 PG
MCV RBC AUTO: 100.5 FL
MICROSCOPIC-UA: NORMAL
NITRITE UR QL STRIP: NORMAL
NITRITE URINE: NEGATIVE
NONHDLC SERPL-MCNC: 133 MG/DL
PH UR STRIP: 7
PH URINE: 7
PLATELET # BLD AUTO: 223 K/UL
POTASSIUM SERPL-SCNC: 4.7 MMOL/L
PROT SERPL-MCNC: 7.5 G/DL
PROT UR STRIP-MCNC: NORMAL
PROTEIN URINE: NEGATIVE MG/DL
RBC # BLD: 3.83 M/UL
RBC # FLD: 12.4 %
RED BLOOD CELLS URINE: 0 /HPF
SODIUM SERPL-SCNC: 133 MMOL/L
SP GR UR STRIP: 1.01
SPECIFIC GRAVITY URINE: 1.01
T3FREE SERPL-MCNC: 3.46 PG/ML
T4 FREE SERPL-MCNC: 1.3 NG/DL
TRIGL SERPL-MCNC: 92 MG/DL
TSH SERPL-ACNC: 3.01 UIU/ML
UROBILINOGEN URINE: 0.2 MG/DL
WBC # FLD AUTO: 3.88 K/UL
WHITE BLOOD CELLS URINE: 3 /HPF

## 2024-02-22 ENCOUNTER — APPOINTMENT (OUTPATIENT)
Dept: FAMILY MEDICINE | Facility: CLINIC | Age: 69
End: 2024-02-22
Payer: MEDICARE

## 2024-02-22 VITALS
SYSTOLIC BLOOD PRESSURE: 120 MMHG | HEART RATE: 69 BPM | OXYGEN SATURATION: 98 % | BODY MASS INDEX: 20.73 KG/M2 | WEIGHT: 117 LBS | TEMPERATURE: 97.3 F | DIASTOLIC BLOOD PRESSURE: 62 MMHG

## 2024-02-22 DIAGNOSIS — E55.9 VITAMIN D DEFICIENCY, UNSPECIFIED: ICD-10-CM

## 2024-02-22 DIAGNOSIS — E87.1 HYPO-OSMOLALITY AND HYPONATREMIA: ICD-10-CM

## 2024-02-22 PROCEDURE — 36415 COLL VENOUS BLD VENIPUNCTURE: CPT

## 2024-02-22 PROCEDURE — 99214 OFFICE O/P EST MOD 30 MIN: CPT

## 2024-02-26 ENCOUNTER — TRANSCRIPTION ENCOUNTER (OUTPATIENT)
Age: 69
End: 2024-02-26

## 2024-02-26 LAB
25(OH)D3 SERPL-MCNC: 80 NG/ML
ANION GAP SERPL CALC-SCNC: 14 MMOL/L
BUN SERPL-MCNC: 13 MG/DL
CALCIUM SERPL-MCNC: 9.7 MG/DL
CHLORIDE SERPL-SCNC: 97 MMOL/L
CO2 SERPL-SCNC: 25 MMOL/L
CREAT SERPL-MCNC: 0.94 MG/DL
EGFR: 67 ML/MIN/1.73M2
GLUCOSE SERPL-MCNC: 97 MG/DL
OSMOLALITY SERPL: 281 MOSMOL/KG
POTASSIUM SERPL-SCNC: 4.8 MMOL/L
SODIUM SERPL-SCNC: 135 MMOL/L

## 2024-04-10 ENCOUNTER — APPOINTMENT (OUTPATIENT)
Dept: ORTHOPEDIC SURGERY | Facility: CLINIC | Age: 69
End: 2024-04-10
Payer: MEDICARE

## 2024-04-10 VITALS — WEIGHT: 115 LBS | HEIGHT: 63 IN | BODY MASS INDEX: 20.38 KG/M2

## 2024-04-10 DIAGNOSIS — M54.16 RADICULOPATHY, LUMBAR REGION: ICD-10-CM

## 2024-04-10 PROCEDURE — 99214 OFFICE O/P EST MOD 30 MIN: CPT

## 2024-04-10 NOTE — PHYSICAL EXAM
[de-identified] : Constitutional: Well-nourished, well-developed, No acute distress Respiratory:  Good respiratory effort, no SOB Lymphatic: No regional lymphadenopathy, no lymphedema Psychiatric: Pleasant and normal affect, alert and oriented x3 Musculoskeletal: normal except where as noted in regional exam  Lumbar Spine Exam  APPEARANCE: no marked deformities or malalignment, normal curvature of the lumbosacral spine. good posture POSITIVE TENDERNESS: + Left lumbar tenderness and spasm noted in erector spinae and quadratus lumborum NONTENDER: no bony midline tenderness, no marked tenderness in right lumbar musculature. ROM: full, although painful at end range of flexion and extension RESISTIVE TESTING: mildly painful 4/5 resisted flex/ext, sidebending b/l, and rotation SPECIAL TESTS: neg SLR b/l, neg RAVINDRA b/l, neg Trendelenburg b/l  PULSES: 2+ DP/PT pulses NEURO:  L1 - S2 intact to sensation and motor, DTRs 2+/4 patella and achilles  Left hip: APPEARANCE: no marked deformities, no swelling or malalignment POSITIVE TENDERNESS: + Piriformis at midsubstance and at its insertion on the greater trochanter NONTENDER: greater trochanter, trochanteric bursa, TFL, gluteal region, ischium/proximal hamstring region, hip flexor region, sartorius and pubic symphysis.  ROM: full & painless.  RESISTIVE TESTING: Mild pain with resisted hip ER, particularly at 90 of hip flexion, painless resisted IR/SLR/abduction/adduction.  SPECIAL TESTS: RAVINDRA reproduces mild pain and tightness in the buttock region, neg FADIR, painless loaded flexion & scouring

## 2024-04-10 NOTE — HISTORY OF PRESENT ILLNESS
[de-identified] : Patient is a 68 year-old female, known to my practice, who presents to the office today for initial evaluation of left leg pain. She complains of left sided lower back pain that began when shoveling a heavy wet snow back on 02/13/2024. Pain is located on the left lumbar area and is a shooting down to the ankle. She denies any paresthesias or weakness of the left lower extremity. Pain is very much activity related and she notes that walking is tolerable. Patient denies any bowel or bladder dysfunction. She took Tylenol which gave relief. She has been doing informal physical therapy, as a close friend works in a PT office. She has noticed improvement since starting the PT. She presents to the office today for further treatment options and is interested in being sent for a possible epidural injection.

## 2024-04-10 NOTE — DISCUSSION/SUMMARY
[de-identified] : Discussed findings of today's exam and possible causes of patient's pain.  Educated patient on their most probable diagnosis of chronic intermittent left lower extremity pain as well as left-sided low back and buttock pain due to radiculopathy; difficult to ascertain if this is left lumbar radiculopathy versus left-sided piriformis syndrome/sciatica based on clinical exam.  Reviewed possible courses of treatment, and we collaboratively decided best course of treatment at this time will include conservative management.  Patient is already tried a course of physical therapy without pain relief.  With the chronicity of her pain and her radicular symptoms I recommend we proceed with MRI of the lumbar spine to evaluate for left-sided disc herniation.  If found this would confirm lumbar radiculopathy as primary etiology, if not found then low that would increase likelihood of left sided piriformis syndrome / sciatica. Patient started on a course of oral NSAIDs, prescription given for Naprosyn (We discussed all possible side effects of this medication).  Patient is advised to benefit from physiatry consultation after her MRI to discuss epidural injections versus sciatic nerve injection depending on which way the MRI determines her etiology of pain which will guide the appropriate injection.  Patient appreciates and agrees with current plan.  This note was generated using dragon medical dictation software.  A reasonable effort has been made for proofreading its contents, but typos may still remain.  If there are any questions or points of clarification needed please notify my office.

## 2024-04-24 RX ORDER — ATORVASTATIN CALCIUM 10 MG/1
10 TABLET, FILM COATED ORAL
Qty: 90 | Refills: 1 | Status: ACTIVE | COMMUNITY
Start: 2021-05-14 | End: 1900-01-01

## 2024-06-17 ENCOUNTER — RX RENEWAL (OUTPATIENT)
Age: 69
End: 2024-06-17

## 2024-06-20 RX ORDER — METOPROLOL SUCCINATE 25 MG/1
25 TABLET, EXTENDED RELEASE ORAL
Qty: 90 | Refills: 0 | Status: ACTIVE | COMMUNITY
Start: 2023-04-17 | End: 1900-01-01

## 2024-06-20 RX ORDER — OLMESARTAN MEDOXOMIL 20 MG/1
20 TABLET, FILM COATED ORAL
Qty: 90 | Refills: 0 | Status: ACTIVE | COMMUNITY
Start: 2022-11-01 | End: 1900-01-01

## 2024-10-24 ENCOUNTER — APPOINTMENT (OUTPATIENT)
Dept: FAMILY MEDICINE | Facility: CLINIC | Age: 69
End: 2024-10-24
Payer: MEDICARE

## 2024-10-24 VITALS
DIASTOLIC BLOOD PRESSURE: 60 MMHG | TEMPERATURE: 98 F | OXYGEN SATURATION: 98 % | WEIGHT: 115 LBS | HEIGHT: 63 IN | HEART RATE: 67 BPM | SYSTOLIC BLOOD PRESSURE: 120 MMHG | BODY MASS INDEX: 20.38 KG/M2

## 2024-10-24 DIAGNOSIS — I10 ESSENTIAL (PRIMARY) HYPERTENSION: ICD-10-CM

## 2024-10-24 DIAGNOSIS — F41.1 GENERALIZED ANXIETY DISORDER: ICD-10-CM

## 2024-10-24 DIAGNOSIS — E78.2 MIXED HYPERLIPIDEMIA: ICD-10-CM

## 2024-10-24 DIAGNOSIS — E55.9 VITAMIN D DEFICIENCY, UNSPECIFIED: ICD-10-CM

## 2024-10-24 PROCEDURE — 99214 OFFICE O/P EST MOD 30 MIN: CPT

## 2024-10-24 PROCEDURE — 36415 COLL VENOUS BLD VENIPUNCTURE: CPT

## 2024-10-25 LAB
ABO + RH PNL BLD: NORMAL
ALBUMIN SERPL ELPH-MCNC: 4.8 G/DL
ALP BLD-CCNC: 63 U/L
ALT SERPL-CCNC: 9 U/L
ANION GAP SERPL CALC-SCNC: 13 MMOL/L
AST SERPL-CCNC: 18 U/L
BILIRUB SERPL-MCNC: 0.5 MG/DL
BUN SERPL-MCNC: 14 MG/DL
CALCIUM SERPL-MCNC: 10 MG/DL
CHLORIDE SERPL-SCNC: 95 MMOL/L
CHOLEST SERPL-MCNC: 220 MG/DL
CO2 SERPL-SCNC: 24 MMOL/L
CREAT SERPL-MCNC: 0.89 MG/DL
EGFR: 71 ML/MIN/1.73M2
ESTIMATED AVERAGE GLUCOSE: 103 MG/DL
GLUCOSE SERPL-MCNC: 92 MG/DL
HBA1C MFR BLD HPLC: 5.2 %
HDLC SERPL-MCNC: 90 MG/DL
LDLC SERPL CALC-MCNC: 120 MG/DL
NONHDLC SERPL-MCNC: 131 MG/DL
POTASSIUM SERPL-SCNC: 4.7 MMOL/L
PROT SERPL-MCNC: 7.4 G/DL
SODIUM SERPL-SCNC: 132 MMOL/L
TRIGL SERPL-MCNC: 64 MG/DL

## 2024-11-13 ENCOUNTER — TRANSCRIPTION ENCOUNTER (OUTPATIENT)
Age: 69
End: 2024-11-13

## 2024-11-26 ENCOUNTER — NON-APPOINTMENT (OUTPATIENT)
Age: 69
End: 2024-11-26

## 2025-01-21 ENCOUNTER — APPOINTMENT (OUTPATIENT)
Dept: FAMILY MEDICINE | Facility: CLINIC | Age: 70
End: 2025-01-21
Payer: MEDICARE

## 2025-01-21 ENCOUNTER — NON-APPOINTMENT (OUTPATIENT)
Age: 70
End: 2025-01-21

## 2025-01-21 ENCOUNTER — RX RENEWAL (OUTPATIENT)
Age: 70
End: 2025-01-21

## 2025-01-21 VITALS
SYSTOLIC BLOOD PRESSURE: 120 MMHG | DIASTOLIC BLOOD PRESSURE: 80 MMHG | TEMPERATURE: 98.4 F | WEIGHT: 118 LBS | OXYGEN SATURATION: 98 % | HEIGHT: 63 IN | HEART RATE: 61 BPM | BODY MASS INDEX: 20.91 KG/M2

## 2025-01-21 DIAGNOSIS — H61.899 OTHER SPECIFIED DISORDERS OF EXTERNAL EAR, UNSPECIFIED EAR: ICD-10-CM

## 2025-01-21 DIAGNOSIS — Z00.00 ENCOUNTER FOR GENERAL ADULT MEDICAL EXAMINATION W/OUT ABNORMAL FINDINGS: ICD-10-CM

## 2025-01-21 DIAGNOSIS — I10 ESSENTIAL (PRIMARY) HYPERTENSION: ICD-10-CM

## 2025-01-21 PROCEDURE — G0439: CPT

## 2025-01-21 PROCEDURE — 99213 OFFICE O/P EST LOW 20 MIN: CPT | Mod: 25

## 2025-01-21 PROCEDURE — 36415 COLL VENOUS BLD VENIPUNCTURE: CPT

## 2025-01-21 PROCEDURE — 93000 ELECTROCARDIOGRAM COMPLETE: CPT

## 2025-01-21 PROCEDURE — 81003 URINALYSIS AUTO W/O SCOPE: CPT | Mod: QW

## 2025-01-21 RX ORDER — FLUOCINOLONE ACETONIDE 0.11 MG/ML
0.01 OIL AURICULAR (OTIC)
Qty: 1 | Refills: 0 | Status: ACTIVE | COMMUNITY
Start: 2025-01-21 | End: 1900-01-01

## 2025-01-22 ENCOUNTER — TRANSCRIPTION ENCOUNTER (OUTPATIENT)
Age: 70
End: 2025-01-22

## 2025-01-22 LAB
25(OH)D3 SERPL-MCNC: 60.9 NG/ML
ALBUMIN SERPL ELPH-MCNC: 4.9 G/DL
ALP BLD-CCNC: 64 U/L
ALT SERPL-CCNC: 13 U/L
ANION GAP SERPL CALC-SCNC: 13 MMOL/L
APPEARANCE: CLEAR
AST SERPL-CCNC: 20 U/L
BACTERIA: NEGATIVE /HPF
BILIRUB SERPL-MCNC: 0.5 MG/DL
BILIRUB UR QL STRIP: NORMAL
BILIRUBIN URINE: NEGATIVE
BLOOD URINE: NEGATIVE
BUN SERPL-MCNC: 15 MG/DL
CALCIUM SERPL-MCNC: 9.8 MG/DL
CAST: 0 /LPF
CHLORIDE SERPL-SCNC: 99 MMOL/L
CHOLEST SERPL-MCNC: 204 MG/DL
CO2 SERPL-SCNC: 25 MMOL/L
COLOR: YELLOW
CREAT SERPL-MCNC: 0.9 MG/DL
EGFR: 70 ML/MIN/1.73M2
EPITHELIAL CELLS: 1 /HPF
ESTIMATED AVERAGE GLUCOSE: 103 MG/DL
GLUCOSE QUALITATIVE U: NEGATIVE MG/DL
GLUCOSE SERPL-MCNC: 96 MG/DL
GLUCOSE UR-MCNC: NORMAL
HBA1C MFR BLD HPLC: 5.2 %
HCG UR QL: 0.2 EU/DL
HCT VFR BLD CALC: 36.7 %
HDLC SERPL-MCNC: 84 MG/DL
HGB BLD-MCNC: 12.2 G/DL
HGB UR QL STRIP.AUTO: NORMAL
KETONES UR-MCNC: NORMAL
KETONES URINE: NEGATIVE MG/DL
LDLC SERPL CALC-MCNC: 106 MG/DL
LEUKOCYTE ESTERASE UR QL STRIP: NORMAL
LEUKOCYTE ESTERASE URINE: NEGATIVE
MCHC RBC-ENTMCNC: 32.2 PG
MCHC RBC-ENTMCNC: 33.2 G/DL
MCV RBC AUTO: 96.8 FL
MICROSCOPIC-UA: NORMAL
NITRITE UR QL STRIP: NORMAL
NITRITE URINE: NEGATIVE
NONHDLC SERPL-MCNC: 120 MG/DL
PH UR STRIP: 5.5
PH URINE: 6.5
PLATELET # BLD AUTO: 199 K/UL
POTASSIUM SERPL-SCNC: 5 MMOL/L
PROT SERPL-MCNC: 7.2 G/DL
PROT UR STRIP-MCNC: NORMAL
PROTEIN URINE: NEGATIVE MG/DL
RBC # BLD: 3.79 M/UL
RBC # FLD: 12.2 %
RED BLOOD CELLS URINE: 0 /HPF
SODIUM SERPL-SCNC: 137 MMOL/L
SP GR UR STRIP: 1.01
SPECIFIC GRAVITY URINE: 1.01
T3FREE SERPL-MCNC: 3.08 PG/ML
T4 FREE SERPL-MCNC: 1.2 NG/DL
TRIGL SERPL-MCNC: 74 MG/DL
TSH SERPL-ACNC: 2.41 UIU/ML
UROBILINOGEN URINE: 0.2 MG/DL
VIT B12 SERPL-MCNC: 494 PG/ML
WBC # FLD AUTO: 3.97 K/UL
WHITE BLOOD CELLS URINE: 0 /HPF

## 2025-04-18 ENCOUNTER — RX RENEWAL (OUTPATIENT)
Age: 70
End: 2025-04-18

## 2025-05-12 ENCOUNTER — APPOINTMENT (OUTPATIENT)
Dept: FAMILY MEDICINE | Facility: CLINIC | Age: 70
End: 2025-05-12
Payer: MEDICARE

## 2025-05-12 VITALS
HEIGHT: 63 IN | DIASTOLIC BLOOD PRESSURE: 80 MMHG | BODY MASS INDEX: 20.91 KG/M2 | WEIGHT: 118 LBS | TEMPERATURE: 97.2 F | SYSTOLIC BLOOD PRESSURE: 170 MMHG | OXYGEN SATURATION: 98 % | HEART RATE: 74 BPM

## 2025-05-12 VITALS — SYSTOLIC BLOOD PRESSURE: 120 MMHG | DIASTOLIC BLOOD PRESSURE: 75 MMHG

## 2025-05-12 DIAGNOSIS — I10 ESSENTIAL (PRIMARY) HYPERTENSION: ICD-10-CM

## 2025-05-12 DIAGNOSIS — L98.7 EXCESSIVE AND REDUNDANT SKIN AND SUBCUTANEOUS TISSUE: ICD-10-CM

## 2025-05-12 DIAGNOSIS — Z01.818 ENCOUNTER FOR OTHER PREPROCEDURAL EXAMINATION: ICD-10-CM

## 2025-05-12 PROCEDURE — 99214 OFFICE O/P EST MOD 30 MIN: CPT

## 2025-05-12 PROCEDURE — G2211 COMPLEX E/M VISIT ADD ON: CPT

## 2025-05-13 LAB
ALBUMIN SERPL ELPH-MCNC: 4.9 G/DL
ALP BLD-CCNC: 64 U/L
ALT SERPL-CCNC: 12 U/L
ANION GAP SERPL CALC-SCNC: 15 MMOL/L
AST SERPL-CCNC: 19 U/L
BILIRUB SERPL-MCNC: 0.6 MG/DL
BUN SERPL-MCNC: 16 MG/DL
CALCIUM SERPL-MCNC: 10.2 MG/DL
CHLORIDE SERPL-SCNC: 100 MMOL/L
CO2 SERPL-SCNC: 23 MMOL/L
CREAT SERPL-MCNC: 0.88 MG/DL
EGFRCR SERPLBLD CKD-EPI 2021: 72 ML/MIN/1.73M2
GLUCOSE SERPL-MCNC: 99 MG/DL
HCT VFR BLD CALC: 38.9 %
HGB BLD-MCNC: 12.6 G/DL
MCHC RBC-ENTMCNC: 32 PG
MCHC RBC-ENTMCNC: 32.4 G/DL
MCV RBC AUTO: 98.7 FL
PLATELET # BLD AUTO: 222 K/UL
POTASSIUM SERPL-SCNC: 4.9 MMOL/L
PROT SERPL-MCNC: 7.3 G/DL
RBC # BLD: 3.94 M/UL
RBC # FLD: 12.3 %
SODIUM SERPL-SCNC: 138 MMOL/L
WBC # FLD AUTO: 3.84 K/UL

## 2025-05-15 ENCOUNTER — NON-APPOINTMENT (OUTPATIENT)
Age: 70
End: 2025-05-15

## 2025-05-15 PROBLEM — L98.7 EXCESS SKIN OF NECK: Status: ACTIVE | Noted: 2025-05-15

## 2025-07-17 ENCOUNTER — RX RENEWAL (OUTPATIENT)
Age: 70
End: 2025-07-17

## 2025-07-25 ENCOUNTER — RX RENEWAL (OUTPATIENT)
Age: 70
End: 2025-07-25